# Patient Record
Sex: MALE | Race: WHITE | NOT HISPANIC OR LATINO | Employment: UNEMPLOYED | URBAN - METROPOLITAN AREA
[De-identification: names, ages, dates, MRNs, and addresses within clinical notes are randomized per-mention and may not be internally consistent; named-entity substitution may affect disease eponyms.]

---

## 2023-12-26 ENCOUNTER — APPOINTMENT (OUTPATIENT)
Dept: GENERAL RADIOLOGY | Facility: HOSPITAL | Age: 33
End: 2023-12-26
Payer: MEDICAID

## 2023-12-26 ENCOUNTER — HOSPITAL ENCOUNTER (EMERGENCY)
Facility: HOSPITAL | Age: 33
Discharge: HOME | End: 2023-12-26
Attending: EMERGENCY MEDICINE | Admitting: EMERGENCY MEDICINE
Payer: MEDICAID

## 2023-12-26 VITALS
TEMPERATURE: 98.7 F | BODY MASS INDEX: 25.06 KG/M2 | RESPIRATION RATE: 18 BRPM | HEIGHT: 72 IN | DIASTOLIC BLOOD PRESSURE: 100 MMHG | OXYGEN SATURATION: 97 % | WEIGHT: 185 LBS | HEART RATE: 119 BPM | SYSTOLIC BLOOD PRESSURE: 146 MMHG

## 2023-12-26 DIAGNOSIS — S92.352A CLOSED DISPLACED FRACTURE OF FIFTH METATARSAL BONE OF LEFT FOOT, INITIAL ENCOUNTER: Primary | ICD-10-CM

## 2023-12-26 PROCEDURE — 63710000001 ONDANSETRON ODT 4 MG TABLET DISPERSIBLE: Performed by: EMERGENCY MEDICINE

## 2023-12-26 PROCEDURE — 99283 EMERGENCY DEPT VISIT LOW MDM: CPT

## 2023-12-26 PROCEDURE — 73610 X-RAY EXAM OF ANKLE: CPT

## 2023-12-26 PROCEDURE — 73630 X-RAY EXAM OF FOOT: CPT

## 2023-12-26 RX ORDER — HYDROCODONE BITARTRATE AND ACETAMINOPHEN 7.5; 325 MG/1; MG/1
1 TABLET ORAL ONCE
Status: COMPLETED | OUTPATIENT
Start: 2023-12-26 | End: 2023-12-26

## 2023-12-26 RX ORDER — ONDANSETRON 4 MG/1
4 TABLET, ORALLY DISINTEGRATING ORAL ONCE
Status: COMPLETED | OUTPATIENT
Start: 2023-12-26 | End: 2023-12-26

## 2023-12-26 RX ADMIN — HYDROCODONE BITARTRATE AND ACETAMINOPHEN 1 TABLET: 7.5; 325 TABLET ORAL at 17:05

## 2023-12-26 RX ADMIN — ONDANSETRON 4 MG: 4 TABLET, ORALLY DISINTEGRATING ORAL at 17:05

## 2023-12-26 NOTE — ED PROVIDER NOTES
EMERGENCY DEPARTMENT ENCOUNTER    Room Number:  S05/05  Date of encounter:  12/26/2023  PCP: Fatuma Rodríguez APRN  Historian: Patient  Chronic or social conditions impacting care (social determinants of health): Nothing    HPI:  Chief Complaint: Left foot injury  A complete HPI/ROS/PMH/PSH/SH/FH are unobtainable due to: Nothing    Context: Shyam Spann is a 33 y.o. male who presents to the ED c/o left foot and ankle injury after mechanical fall last night.  Patient reports falling down several steps.  He denies any head, neck, trunk injuries.  Complains of diffuse left ankle and left foot pain.  Pain is constant.  He is unable to bear weight.  He denies any previous history of ankle injuries.     Review of prior external notes (non-ED):   I reviewed ER visit from 9/22/2023.  Patient seen for sinusitis.    Review of prior external test results outside of this encounter:  Reviewed a CMP from 12/13/2021.  Creatininezero 1.01, potassium 4.9    PAST MEDICAL HISTORY  Active Ambulatory Problems     Diagnosis Date Noted    No Active Ambulatory Problems     Resolved Ambulatory Problems     Diagnosis Date Noted    No Resolved Ambulatory Problems     No Additional Past Medical History         PAST SURGICAL HISTORY  No past surgical history on file.      FAMILY HISTORY  No family history on file.      SOCIAL HISTORY  Social History     Socioeconomic History    Marital status: Single         ALLERGIES  Penicillins        REVIEW OF SYSTEMS  All systems reviewed and negative except for those discussed in HPI.       PHYSICAL EXAM    I have reviewed the triage vital signs and nursing notes.    ED Triage Vitals   Temp Heart Rate Resp BP SpO2   12/26/23 1519 12/26/23 1519 12/26/23 1536 12/26/23 1536 12/26/23 1519   98.7 °F (37.1 °C) 119 18 146/100 97 %      Temp src Heart Rate Source Patient Position BP Location FiO2 (%)   12/26/23 1519 12/26/23 1519 -- -- --   Tympanic Monitor          Physical Exam  GENERAL: Alert, oriented, not  distressed  HENT: head atraumatic, no tenderness  EYES: no scleral icterus, EOMI  CV: regular rhythm, regular rate, no murmur  RESPIRATORY: normal effort, CTA  ABDOMEN: soft, nontender  MUSCULOSKELETAL: Moderate diffuse swelling and tenderness to the left foot without deformity.  Mild tenderness to the medial lateral ankles without deformity.  Subacute ecchymosis present.  Neurovascularly intact distally.  NEURO: alert, moves all extremities, follows commands  SKIN: warm, dry    RADIOLOGY  XR Foot 3+ View Left, XR Ankle 3+ View Left    Result Date: 12/26/2023  XR ANKLE 3+ VW LEFT-, XR FOOT 3+ VW LEFT-  Clinical: Edema and pain, fell on 12/25/2023  FINDINGS: There is soft tissue swelling demonstrated about the lateral aspect of the left ankle and foot. The ankle joint is normal in appearance. There is a fracture demonstrated at the base of the fifth metatarsal. Fracture fragment is displaced approximately 5 mm. No other acute osseous abnormality of the forefoot is seen. No hindfoot abnormality is demonstrated.  CONCLUSION: Fracture at the base of the fifth metatarsal.  This report was finalized on 12/26/2023 4:21 PM by Dr. Tobias oSuza M.D on Workstation: [id number]       I ordered the above noted radiological studies. Reviewed by me and discussed with radiologist.  See dictation for official radiology interpretation.      MEDICATIONS GIVEN IN ER    Medications   HYDROcodone-acetaminophen (NORCO) 7.5-325 MG per tablet 1 tablet (1 tablet Oral Given 12/26/23 1705)   ondansetron ODT (ZOFRAN-ODT) disintegrating tablet 4 mg (4 mg Oral Given 12/26/23 1705)         ADDITIONAL ORDERS CONSIDERED BUT NOT ORDERED:  Nothing      PROGRESS, DATA ANALYSIS, CONSULTS, AND MEDICAL DECISION MAKING    All labs have been independently interpreted by myself.  All radiology studies have been independently interpreted by myself and discussed with radiologist dictating the report.   EKG's independently interpreted by myself.  Discussion  below represents my analysis of pertinent findings related to patient's condition, differential diagnosis, treatment plan and final disposition.    I have discussed case with Dr. Sepulveda, emergency room physician.  He has performed his own bedside examination and agrees with treatment plan.    ED Course as of 12/26/23 2015   Tue Dec 26, 2023   1654 Patient presents with left foot and ankle pain after mechanical fall yesterday.  No head, neck, trunk injuries.    Left foot and ankle images independently interpreted myself show a fracture of the base of the fifth metatarsal.  Ankle mortise appears intact.    Plan for orthopedic boot, crutches, pain control. [EE]   1707 I reviewed the [facility] report.  The patient is on Suboxone.  Plan anti-inflammatories for pain.   [TR]   1766 Updated patient on workup.  I explained we would not be giving him any pain medicine.  He will take over-the-counter Advil and Tylenol.    Patient left prior to being discharged. [EE]      ED Course User Index  [EE] Gaurav Rader PA  [TR] Eddy Sepulveda MD       AS OF 20:15 EST VITALS:    BP - 146/100  HR - 119  TEMP - 98.7 °F (37.1 °C) (Tympanic)  O2 SATS - 97%        DIAGNOSIS  Final diagnoses:   Closed displaced fracture of fifth metatarsal bone of left foot, initial encounter         DISPOSITION  Discharged      Dictated utilizing Dragon dictation     Gaurav Rader PA  12/26/23 2016

## 2023-12-26 NOTE — ED PROVIDER NOTES
MD ATTESTATION NOTE    The SOFIA and I have discussed this patient's history, physical exam, and treatment plan.  I have reviewed the documentation and personally had a face to face interaction with the patient. I affirm the documentation and agree with the treatment and plan.  The attached note describes my personal findings.    I provided a substantive portion of the care of this patient. I personally performed the physical exam, in its entirety.    Independent Historians: Patient    A complete HPI/ROS/PMH/PSH/SH/FH are unobtainable due to: Nothing    Chronic or social conditions impacting patient care (social determinants of health): None    Shyam Spann is a 33 y.o. male who presents to the ED c/o acute left ankle injury.  The patient reports that last night he fell.  He fell down several steps while he was carrying things.  He injured his left ankle.  He denies other injury.  He denies hitting his head.  He denies any neck or back pain.  He denies any shoulder pain.  He states he has not been able to bear weight because of the pain.      Review of prior external notes (non-ED) -and- Review of prior external test results outside of this encounter: Laboratory evaluation 12/13/2021 shows normal CBC, normal CMP    Prescription drug monitoring program review: [facility] reviewed by Eddy Sepulveda MD      On exam:  GENERAL: Awake, alert, no acute distress  SKIN: Warm, dry  HENT: Normocephalic, atraumatic  EYES: no scleral icterus  CV: regular rhythm, regular rate  RESPIRATORY: normal effort, lungs clear  ABDOMEN: soft, nontender, nondistended  MUSCULOSKELETAL: no deformity, no midline cervical, thoracic, or lumbar spine tenderness.  Left ankle and foot with significant ecchymosis.  Intact pulses.  No open wounds or deformity.  No tenderness to the knee or hip.  NEURO: alert, moves all extremities, follows commands                                                             Labs  No results found for this or any previous  visit (from the past 24 hour(s)).    Radiology  XR Foot 3+ View Left, XR Ankle 3+ View Left    Result Date: 12/26/2023  XR ANKLE 3+ VW LEFT-, XR FOOT 3+ VW LEFT-  Clinical: Edema and pain, fell on 12/25/2023  FINDINGS: There is soft tissue swelling demonstrated about the lateral aspect of the left ankle and foot. The ankle joint is normal in appearance. There is a fracture demonstrated at the base of the fifth metatarsal. Fracture fragment is displaced approximately 5 mm. No other acute osseous abnormality of the forefoot is seen. No hindfoot abnormality is demonstrated.  CONCLUSION: Fracture at the base of the fifth metatarsal.  This report was finalized on 12/26/2023 4:21 PM by Dr. Tobias Souza M.D on Workstation: [id number]       Medical Decision Making:  ED Course as of 12/26/23 1856 Tue Dec 26, 2023   1654 Patient presents with left foot and ankle pain after mechanical fall yesterday.  No head, neck, trunk injuries.    Left foot and ankle images independently interpreted myself show a fracture of the base of the fifth metatarsal.  Ankle mortise appears intact.    Plan for orthopedic boot, crutches, pain control. [EE]   1707 I reviewed the [facility] report.  The patient is on Suboxone.  Plan anti-inflammatories for pain.   [TR]   8071 Updated patient on workup.  I explained we would not be giving him any pain medicine.  He will take over-the-counter Advil and Tylenol.    Patient left prior to being discharged. [EE]      ED Course User Index  [EE] Gaurva Rader PA  [TR] Eddy Sepulveda MD       Clinical Scores:              The patient presents with acute injury to the left ankle and foot.  Plan imaging to rule out fracture.  My differential diagnosis includes ankle fracture, foot fracture, ankle dislocation, intracranial hemorrhage, and others.    Procedures:  Procedures                  Diagnosis  Final diagnoses:   Closed displaced fracture of fifth metatarsal bone of left foot, initial encounter       Note  Disclaimer: At [facility], we believe that sharing information builds trust and better relationships. You are receiving this note because you recently visited [facility]. It is possible you will see health information before a provider has talked with you about it. This kind of information can be easy to misunderstand. To help you fully understand what it means for your health, we urge you to discuss this note with your provider.       Eddy Sepulveda MD  12/26/23 9084